# Patient Record
Sex: FEMALE | Race: WHITE | Employment: OTHER | URBAN - METROPOLITAN AREA
[De-identification: names, ages, dates, MRNs, and addresses within clinical notes are randomized per-mention and may not be internally consistent; named-entity substitution may affect disease eponyms.]

---

## 2018-10-30 ENCOUNTER — HOSPITAL ENCOUNTER (OUTPATIENT)
Dept: GENERAL RADIOLOGY | Age: 63
Discharge: HOME | End: 2018-11-01
Payer: COMMERCIAL

## 2018-10-30 DIAGNOSIS — Z12.31 VISIT FOR SCREENING MAMMOGRAM: ICD-10-CM

## 2018-10-30 PROCEDURE — 77063 BREAST TOMOSYNTHESIS BI: CPT

## 2019-01-12 ENCOUNTER — HOSPITAL ENCOUNTER (OUTPATIENT)
Dept: MRI IMAGING | Age: 64
Discharge: HOME | End: 2019-01-14
Payer: COMMERCIAL

## 2019-01-12 DIAGNOSIS — M43.10 ANTEROLISTHESIS: ICD-10-CM

## 2019-01-14 ENCOUNTER — OFFICE VISIT (OUTPATIENT)
Dept: NEUROSURGERY | Age: 64
End: 2019-01-14
Payer: COMMERCIAL

## 2019-01-14 VITALS
DIASTOLIC BLOOD PRESSURE: 96 MMHG | HEART RATE: 79 BPM | HEIGHT: 62 IN | BODY MASS INDEX: 33.86 KG/M2 | SYSTOLIC BLOOD PRESSURE: 122 MMHG | WEIGHT: 184 LBS

## 2019-01-14 DIAGNOSIS — M51.36 LUMBAR DEGENERATIVE DISC DISEASE: Primary | ICD-10-CM

## 2019-01-14 PROCEDURE — G8417 CALC BMI ABV UP PARAM F/U: HCPCS | Performed by: PHYSICIAN ASSISTANT

## 2019-01-14 PROCEDURE — 4004F PT TOBACCO SCREEN RCVD TLK: CPT | Performed by: PHYSICIAN ASSISTANT

## 2019-01-14 PROCEDURE — 3017F COLORECTAL CA SCREEN DOC REV: CPT | Performed by: PHYSICIAN ASSISTANT

## 2019-01-14 PROCEDURE — G8484 FLU IMMUNIZE NO ADMIN: HCPCS | Performed by: PHYSICIAN ASSISTANT

## 2019-01-14 PROCEDURE — G8427 DOCREV CUR MEDS BY ELIG CLIN: HCPCS | Performed by: PHYSICIAN ASSISTANT

## 2019-01-14 PROCEDURE — 99203 OFFICE O/P NEW LOW 30 MIN: CPT | Performed by: PHYSICIAN ASSISTANT

## 2019-01-14 ASSESSMENT — ENCOUNTER SYMPTOMS
GASTROINTESTINAL NEGATIVE: 1
BACK PAIN: 1
ALLERGIC/IMMUNOLOGIC NEGATIVE: 1
RESPIRATORY NEGATIVE: 1
EYES NEGATIVE: 1

## 2019-01-17 ENCOUNTER — HOSPITAL ENCOUNTER (OUTPATIENT)
Dept: GENERAL RADIOLOGY | Age: 64
Discharge: HOME | End: 2019-01-19
Payer: COMMERCIAL

## 2019-01-17 ENCOUNTER — HOSPITAL ENCOUNTER (OUTPATIENT)
Dept: MRI IMAGING | Age: 64
Discharge: HOME | End: 2019-01-19
Payer: COMMERCIAL

## 2019-01-17 ENCOUNTER — HOSPITAL ENCOUNTER (OUTPATIENT)
Age: 64
Discharge: HOME | End: 2019-01-19
Payer: COMMERCIAL

## 2019-01-17 DIAGNOSIS — M51.36 LUMBAR DEGENERATIVE DISC DISEASE: ICD-10-CM

## 2019-01-17 PROCEDURE — 72120 X-RAY BEND ONLY L-S SPINE: CPT

## 2019-01-17 PROCEDURE — 72100 X-RAY EXAM L-S SPINE 2/3 VWS: CPT

## 2019-01-17 PROCEDURE — 72148 MRI LUMBAR SPINE W/O DYE: CPT

## 2019-02-11 ENCOUNTER — OFFICE VISIT (OUTPATIENT)
Dept: NEUROSURGERY | Age: 64
End: 2019-02-11
Payer: COMMERCIAL

## 2019-02-11 VITALS
WEIGHT: 188 LBS | HEIGHT: 62 IN | SYSTOLIC BLOOD PRESSURE: 145 MMHG | HEART RATE: 83 BPM | DIASTOLIC BLOOD PRESSURE: 99 MMHG | BODY MASS INDEX: 34.6 KG/M2

## 2019-02-11 DIAGNOSIS — M51.26 LUMBAR DISC HERNIATION: Primary | ICD-10-CM

## 2019-02-11 PROCEDURE — 3017F COLORECTAL CA SCREEN DOC REV: CPT | Performed by: PHYSICIAN ASSISTANT

## 2019-02-11 PROCEDURE — 1036F TOBACCO NON-USER: CPT | Performed by: PHYSICIAN ASSISTANT

## 2019-02-11 PROCEDURE — G8417 CALC BMI ABV UP PARAM F/U: HCPCS | Performed by: PHYSICIAN ASSISTANT

## 2019-02-11 PROCEDURE — 99213 OFFICE O/P EST LOW 20 MIN: CPT | Performed by: PHYSICIAN ASSISTANT

## 2019-02-11 PROCEDURE — G8484 FLU IMMUNIZE NO ADMIN: HCPCS | Performed by: PHYSICIAN ASSISTANT

## 2019-02-11 PROCEDURE — G8427 DOCREV CUR MEDS BY ELIG CLIN: HCPCS | Performed by: PHYSICIAN ASSISTANT

## 2019-02-11 RX ORDER — CELECOXIB 200 MG/1
200 CAPSULE ORAL DAILY
Qty: 60 CAPSULE | Refills: 3 | Status: SHIPPED | OUTPATIENT
Start: 2019-02-11 | End: 2019-11-22

## 2019-11-22 ENCOUNTER — OFFICE VISIT (OUTPATIENT)
Dept: PAIN MANAGEMENT | Age: 64
End: 2019-11-22
Payer: COMMERCIAL

## 2019-11-22 VITALS
OXYGEN SATURATION: 96 % | HEART RATE: 86 BPM | WEIGHT: 205 LBS | RESPIRATION RATE: 16 BRPM | TEMPERATURE: 98.7 F | BODY MASS INDEX: 37.73 KG/M2 | SYSTOLIC BLOOD PRESSURE: 130 MMHG | HEIGHT: 62 IN | DIASTOLIC BLOOD PRESSURE: 88 MMHG

## 2019-11-22 DIAGNOSIS — M47.816 LUMBAR FACET ARTHROPATHY: ICD-10-CM

## 2019-11-22 DIAGNOSIS — F11.11 HISTORY OF OPIOID ABUSE (HCC): ICD-10-CM

## 2019-11-22 DIAGNOSIS — M47.816 LUMBAR SPONDYLOSIS: Primary | ICD-10-CM

## 2019-11-22 DIAGNOSIS — G89.4 CHRONIC PAIN SYNDROME: ICD-10-CM

## 2019-11-22 DIAGNOSIS — M51.9 LUMBAR DISC DISORDER: ICD-10-CM

## 2019-11-22 PROCEDURE — 99204 OFFICE O/P NEW MOD 45 MIN: CPT | Performed by: PAIN MEDICINE

## 2019-11-22 PROCEDURE — 1036F TOBACCO NON-USER: CPT | Performed by: PAIN MEDICINE

## 2019-11-22 PROCEDURE — G8417 CALC BMI ABV UP PARAM F/U: HCPCS | Performed by: PAIN MEDICINE

## 2019-11-22 PROCEDURE — G8484 FLU IMMUNIZE NO ADMIN: HCPCS | Performed by: PAIN MEDICINE

## 2019-11-22 PROCEDURE — G8427 DOCREV CUR MEDS BY ELIG CLIN: HCPCS | Performed by: PAIN MEDICINE

## 2019-11-22 PROCEDURE — 3017F COLORECTAL CA SCREEN DOC REV: CPT | Performed by: PAIN MEDICINE

## 2019-11-22 RX ORDER — VARENICLINE TARTRATE 1 MG/1
TABLET, FILM COATED ORAL
COMMUNITY
Start: 2019-08-30 | End: 2021-05-03 | Stop reason: ALTCHOICE

## 2019-11-22 RX ORDER — ESTROGEN,CON/M-PROGEST ACET 0.3-1.5MG
TABLET ORAL
COMMUNITY
Start: 2019-11-16 | End: 2021-05-03 | Stop reason: ALTCHOICE

## 2019-11-22 RX ORDER — BUSPIRONE HYDROCHLORIDE 10 MG/1
TABLET ORAL
COMMUNITY
Start: 2019-10-18 | End: 2021-05-03 | Stop reason: ALTCHOICE

## 2021-04-26 ENCOUNTER — HOSPITAL ENCOUNTER (OUTPATIENT)
Dept: INTERVENTIONAL RADIOLOGY/VASCULAR | Age: 66
Discharge: HOME | End: 2021-04-28
Payer: COMMERCIAL

## 2021-04-26 ENCOUNTER — HOSPITAL ENCOUNTER (OUTPATIENT)
Dept: ULTRASOUND IMAGING | Age: 66
Discharge: HOME | End: 2021-04-26
Payer: COMMERCIAL

## 2021-04-26 DIAGNOSIS — R60.9 EDEMA, UNSPECIFIED TYPE: ICD-10-CM

## 2021-04-26 PROCEDURE — 93922 UPR/L XTREMITY ART 2 LEVELS: CPT

## 2021-04-26 PROCEDURE — 93970 EXTREMITY STUDY: CPT

## 2021-05-03 ENCOUNTER — OFFICE VISIT (OUTPATIENT)
Dept: CARDIOLOGY CLINIC | Age: 66
End: 2021-05-03
Payer: COMMERCIAL

## 2021-05-03 VITALS
RESPIRATION RATE: 16 BRPM | OXYGEN SATURATION: 96 % | BODY MASS INDEX: 39.42 KG/M2 | HEIGHT: 62 IN | DIASTOLIC BLOOD PRESSURE: 82 MMHG | WEIGHT: 214.2 LBS | SYSTOLIC BLOOD PRESSURE: 122 MMHG | HEART RATE: 86 BPM

## 2021-05-03 DIAGNOSIS — R06.2 WHEEZING: ICD-10-CM

## 2021-05-03 DIAGNOSIS — R94.31 ABNORMAL EKG: Primary | ICD-10-CM

## 2021-05-03 PROCEDURE — 93000 ELECTROCARDIOGRAM COMPLETE: CPT | Performed by: INTERNAL MEDICINE

## 2021-05-03 PROCEDURE — G8417 CALC BMI ABV UP PARAM F/U: HCPCS | Performed by: INTERNAL MEDICINE

## 2021-05-03 PROCEDURE — 1090F PRES/ABSN URINE INCON ASSESS: CPT | Performed by: INTERNAL MEDICINE

## 2021-05-03 PROCEDURE — G8400 PT W/DXA NO RESULTS DOC: HCPCS | Performed by: INTERNAL MEDICINE

## 2021-05-03 PROCEDURE — 1036F TOBACCO NON-USER: CPT | Performed by: INTERNAL MEDICINE

## 2021-05-03 PROCEDURE — 4040F PNEUMOC VAC/ADMIN/RCVD: CPT | Performed by: INTERNAL MEDICINE

## 2021-05-03 PROCEDURE — 3017F COLORECTAL CA SCREEN DOC REV: CPT | Performed by: INTERNAL MEDICINE

## 2021-05-03 PROCEDURE — G8427 DOCREV CUR MEDS BY ELIG CLIN: HCPCS | Performed by: INTERNAL MEDICINE

## 2021-05-03 PROCEDURE — 1123F ACP DISCUSS/DSCN MKR DOCD: CPT | Performed by: INTERNAL MEDICINE

## 2021-05-03 PROCEDURE — 99202 OFFICE O/P NEW SF 15 MIN: CPT | Performed by: INTERNAL MEDICINE

## 2021-05-03 RX ORDER — ARIPIPRAZOLE 5 MG/1
5 TABLET ORAL DAILY
COMMUNITY

## 2021-05-03 RX ORDER — POTASSIUM CHLORIDE 20 MEQ/1
20 TABLET, EXTENDED RELEASE ORAL DAILY
Qty: 30 TABLET | Refills: 5 | Status: SHIPPED | OUTPATIENT
Start: 2021-05-03

## 2021-05-03 RX ORDER — FUROSEMIDE 40 MG/1
40 TABLET ORAL DAILY
Qty: 90 TABLET | Refills: 1 | Status: SHIPPED | OUTPATIENT
Start: 2021-05-03

## 2021-05-03 RX ORDER — ACETAMINOPHEN 160 MG
TABLET,DISINTEGRATING ORAL DAILY
COMMUNITY

## 2021-05-03 RX ORDER — QUETIAPINE FUMARATE 200 MG/1
200 TABLET, FILM COATED ORAL DAILY
COMMUNITY

## 2021-05-03 SDOH — HEALTH STABILITY: MENTAL HEALTH: HOW OFTEN DO YOU HAVE A DRINK CONTAINING ALCOHOL?: NOT ASKED

## 2021-05-03 SDOH — HEALTH STABILITY: MENTAL HEALTH: HOW MANY STANDARD DRINKS CONTAINING ALCOHOL DO YOU HAVE ON A TYPICAL DAY?: NOT ASKED

## 2021-05-03 ASSESSMENT — ENCOUNTER SYMPTOMS
VOMITING: 0
NAUSEA: 0
CONSTIPATION: 0
ABDOMINAL PAIN: 0
BACK PAIN: 1
WHEEZING: 0
SHORTNESS OF BREATH: 0
BLOOD IN STOOL: 0
COUGH: 1
DIARRHEA: 0

## 2021-05-04 ENCOUNTER — TELEPHONE (OUTPATIENT)
Dept: CARDIOLOGY | Age: 66
End: 2021-05-04

## 2021-06-18 ENCOUNTER — TELEPHONE (OUTPATIENT)
Dept: CARDIOLOGY | Age: 66
End: 2021-06-18

## 2021-09-16 ENCOUNTER — OFFICE VISIT (OUTPATIENT)
Dept: PULMONOLOGY | Age: 66
End: 2021-09-16
Payer: MEDICARE

## 2021-09-16 VITALS
OXYGEN SATURATION: 96 % | HEIGHT: 62 IN | HEART RATE: 85 BPM | RESPIRATION RATE: 18 BRPM | WEIGHT: 214 LBS | BODY MASS INDEX: 39.38 KG/M2 | SYSTOLIC BLOOD PRESSURE: 171 MMHG | DIASTOLIC BLOOD PRESSURE: 82 MMHG

## 2021-09-16 DIAGNOSIS — J44.9 CHRONIC OBSTRUCTIVE PULMONARY DISEASE, UNSPECIFIED COPD TYPE (HCC): Primary | ICD-10-CM

## 2021-09-16 DIAGNOSIS — R06.2 WHEEZE: ICD-10-CM

## 2021-09-16 DIAGNOSIS — Z72.0 TOBACCO USE: ICD-10-CM

## 2021-09-16 DIAGNOSIS — Z71.85 VACCINE COUNSELING: ICD-10-CM

## 2021-09-16 LAB
DLCO %PRED: 66 %
DLCO PRED: 21.56 ML/MIN/MMHG
DLCO/VA %PRED: NORMAL
DLCO/VA PRED: NORMAL
DLCO/VA: NORMAL
DLCO: 14.32 ML/MIN/MMHG
EXPIRATORY TIME-POST: NORMAL
EXPIRATORY TIME: NORMAL
FEF 25-75% %CHNG: NORMAL
FEF 25-75% %PRED-POST: NORMAL
FEF 25-75% %PRED-PRE: NORMAL
FEF 25-75% PRED: NORMAL
FEF 25-75%-POST: NORMAL
FEF 25-75%-PRE: NORMAL
FEV1 %PRED-POST: 79 %
FEV1 %PRED-PRE: 72 %
FEV1 PRED: 2.19 L
FEV1-POST: 1.73 L
FEV1-PRE: 1.59 L
FEV1/FVC %PRED-POST: 91 %
FEV1/FVC %PRED-PRE: 92 %
FEV1/FVC PRED: 73 %
FEV1/FVC-POST: 72 %
FEV1/FVC-PRE: 79 %
FVC %PRED-POST: 86 L
FVC %PRED-PRE: 77 %
FVC PRED: 2.79 L
FVC-POST: 2.41 L
FVC-PRE: 2.17 L
GAW %PRED: NORMAL
GAW PRED: NORMAL
GAW: NORMAL
IC %PRED: NORMAL
IC PRED: NORMAL
IC: NORMAL
MEP: NORMAL
MIP: NORMAL
MVV %PRED-PRE: 61 %
MVV PRED: 84 L/MIN
MVV-PRE: 51 L/MIN
PEF %PRED-POST: NORMAL
PEF %PRED-PRE: NORMAL
PEF PRED: NORMAL
PEF%CHNG: NORMAL
PEF-POST: NORMAL
PEF-PRE: NORMAL
RAW %PRED: NORMAL
RAW PRED: NORMAL
RAW: NORMAL
RV %PRED: NORMAL
RV PRED: NORMAL
RV: NORMAL
SVC %PRED: NORMAL
SVC PRED: NORMAL
SVC: NORMAL
TLC %PRED: 269 %
TLC PRED: 2.69 L
TLC: 9.35 L
VA %PRED: NORMAL
VA PRED: NORMAL
VA: NORMAL
VTG %PRED: NORMAL
VTG PRED: NORMAL
VTG: NORMAL

## 2021-09-16 PROCEDURE — 4040F PNEUMOC VAC/ADMIN/RCVD: CPT | Performed by: INTERNAL MEDICINE

## 2021-09-16 PROCEDURE — 99203 OFFICE O/P NEW LOW 30 MIN: CPT | Performed by: INTERNAL MEDICINE

## 2021-09-16 PROCEDURE — G8925 SPIR FEV1/FVC>=60% & NO COPD: HCPCS | Performed by: INTERNAL MEDICINE

## 2021-09-16 PROCEDURE — G8417 CALC BMI ABV UP PARAM F/U: HCPCS | Performed by: INTERNAL MEDICINE

## 2021-09-16 PROCEDURE — 94060 EVALUATION OF WHEEZING: CPT | Performed by: INTERNAL MEDICINE

## 2021-09-16 PROCEDURE — 3017F COLORECTAL CA SCREEN DOC REV: CPT | Performed by: INTERNAL MEDICINE

## 2021-09-16 PROCEDURE — 3023F SPIROM DOC REV: CPT | Performed by: INTERNAL MEDICINE

## 2021-09-16 PROCEDURE — 1123F ACP DISCUSS/DSCN MKR DOCD: CPT | Performed by: INTERNAL MEDICINE

## 2021-09-16 PROCEDURE — 94727 GAS DIL/WSHOT DETER LNG VOL: CPT | Performed by: INTERNAL MEDICINE

## 2021-09-16 PROCEDURE — G8400 PT W/DXA NO RESULTS DOC: HCPCS | Performed by: INTERNAL MEDICINE

## 2021-09-16 PROCEDURE — 1036F TOBACCO NON-USER: CPT | Performed by: INTERNAL MEDICINE

## 2021-09-16 PROCEDURE — 1090F PRES/ABSN URINE INCON ASSESS: CPT | Performed by: INTERNAL MEDICINE

## 2021-09-16 PROCEDURE — G8427 DOCREV CUR MEDS BY ELIG CLIN: HCPCS | Performed by: INTERNAL MEDICINE

## 2021-09-16 RX ORDER — VARENICLINE TARTRATE
1 KIT SEE ADMIN INSTRUCTIONS
Qty: 42 TABLET | Refills: 3 | Status: SHIPPED
Start: 2021-09-16 | End: 2021-11-27

## 2021-09-16 RX ORDER — ALBUTEROL SULFATE 90 UG/1
2 AEROSOL, METERED RESPIRATORY (INHALATION) EVERY 6 HOURS PRN
Qty: 1 EACH | Refills: 3 | Status: SHIPPED | OUTPATIENT
Start: 2021-09-16

## 2021-09-16 ASSESSMENT — PULMONARY FUNCTION TESTS
FEV1/FVC_POST: 72
FEV1_PERCENT_PREDICTED_PRE: 72
FEV1/FVC_PREDICTED: 73
FEV1/FVC_PERCENT_PREDICTED_POST: 91
FVC_PERCENT_PREDICTED_POST: 86
FVC_PRE: 2.17
FEV1_POST: 1.73
FEV1/FVC_PERCENT_PREDICTED_PRE: 92
FEV1_PERCENT_PREDICTED_POST: 79
FEV1_PREDICTED: 2.19
FEV1/FVC_PRE: 79
FVC_PERCENT_PREDICTED_PRE: 77
FVC_POST: 2.41
FEV1_PRE: 1.59
FVC_PREDICTED: 2.79

## 2021-09-16 NOTE — PATIENT INSTRUCTIONS
[address] [facility]  [address] [facility], [address] [place], [place] [address]sarah S  Office: [phone]      Your were seen in the office today for COPD/wheezing      We  did make changes to your medications today. Chantix ordered. Albuterol as needed. Incruse inhaler daily. Testing ordered today was none      Vaccines recommended Prevnar 13, influenza, covid-19. Please do not hesitate to call the office with any questions.

## 2021-10-24 NOTE — PROGRESS NOTES
[facility]     HISTORY OF PRESENT ILLNESS:    Venora Aase is a 73y.o. year old female here for evaluation of wheezing. The patient reports current symptoms of coughing, wheezing, and dyspnea with exertion. She also reports increased sinus drainage and congestion. She reports that the change of seasons makes her symptoms worse. She is currently smoking 1/2 pack/day. She denies hemoptysis. Denies any unintentional weight loss. ALLERGIES:    Allergies   Allergen Reactions    Acetaminophen     Asa [Aspirin]     Ibuprofen        PAST MEDICAL HISTORY:       Diagnosis Date    Anxiety     Bruising tendency (HCC)     Chronic back pain     DDD (degenerative disc disease), lumbar 05/27/2014    Depression     Headache     Knee injury right knee     Leg pain     Radiculopathy, lumbar region 05/09/2014    Tobacco abuse        MEDICATIONS:   Current Outpatient Medications   Medication Sig Dispense Refill    Umeclidinium Bromide 62.5 MCG/INH AEPB Inhale 1 puff into the lungs daily 1 each 3    varenicline (CHANTIX STARTING MONTH PAK) 0.5 MG X 11 & 1 MG X 42 tablet Take 1 mg by mouth See Admin Instructions Take by mouth. 42 tablet 3    albuterol sulfate HFA (VENTOLIN HFA) 108 (90 Base) MCG/ACT inhaler Inhale 2 puffs into the lungs every 6 hours as needed for Wheezing 1 each 3    ARIPiprazole (ABILIFY) 5 MG tablet Take 5 mg by mouth daily      QUEtiapine (SEROQUEL) 200 MG tablet Take 200 mg by mouth daily      Cholecalciferol (VITAMIN D3) 50 MCG (2000 UT) CAPS Take by mouth daily      furosemide (LASIX) 40 MG tablet Take 1 tablet by mouth daily 90 tablet 1    potassium chloride (KLOR-CON M) 20 MEQ extended release tablet Take 1 tablet by mouth daily 30 tablet 5    pantoprazole (PROTONIX) 40 MG tablet Take 40 mg by mouth daily      mirtazapine (REMERON) 30 MG tablet Take 30 mg by mouth nightly.         desvenlafaxine (PRISTIQ) 50 MG TB24 72% of predicted. FEV1 FVC ratio is 73. There is no significant bronchodilator response. MVV is 51 which is 61% of predicted. Lung volumes show SVC of 2.14 which is 76% of predicted. RV is 7.21 L which is 360% of predicted. Total lung capacity is 9.37 which is 196% of predicted. Diffusion shows diffusion of 14.32 which is 66% of predicted. Corrected for alveolar ventilation this is 75% of predicted. There is some scooping of the expiratory flow volume loop on the flow volume loop which is consistent with obstruction. Overall pulmonary function testing is consistent with a moderate obstruction with no significant bronchodilator response along with significant air trapping and a moderate reduction in DLCO. IMPRESSION:       1. COPD  2. Tobacco use  3. Vaccine counseling              PLAN:      1. Regarding the patient's COPD she is to continue as needed albuterol and also Incruse. She is to call the office if her symptoms worsen or fail to improve. 2.  Regarding tobacco use we did speak extensively about the continued drop in lung function and risk of strokes, coronary artery disease, cancer, and worsening pulmonary function. She was prescribed Chantix at this time and reports that she has previously quit smoking with this. 3.  The patient was extensively counseled regarding vaccine use. We did recommend the COVID-19 vaccine, pneumococcal vaccines, and influenza vaccine. The patient declines at this time. I hope this updates you on my evaluation and clinical thinking. Thank you for allowing me to participate in his care.      Sincerely,        Sivakumar Araiza.  Office: [phone]  Fax: [phone]

## 2021-11-27 ENCOUNTER — APPOINTMENT (OUTPATIENT)
Dept: CT IMAGING | Age: 66
End: 2021-11-27
Payer: MEDICARE

## 2021-11-27 ENCOUNTER — HOSPITAL ENCOUNTER (EMERGENCY)
Age: 66
Discharge: HOME | End: 2021-11-27
Payer: MEDICARE

## 2021-11-27 VITALS
SYSTOLIC BLOOD PRESSURE: 149 MMHG | OXYGEN SATURATION: 98 % | RESPIRATION RATE: 16 BRPM | BODY MASS INDEX: 41.22 KG/M2 | HEART RATE: 90 BPM | TEMPERATURE: 98.4 F | DIASTOLIC BLOOD PRESSURE: 86 MMHG | HEIGHT: 62 IN | WEIGHT: 224 LBS

## 2021-11-27 DIAGNOSIS — R59.1 LYMPHADENOPATHY OF HEAD AND NECK: Primary | ICD-10-CM

## 2021-11-27 LAB
ALBUMIN SERPL-MCNC: 4.2 G/DL (ref 3.5–5.2)
ALP BLD-CCNC: 84 U/L (ref 35–104)
ALT SERPL-CCNC: 26 U/L (ref 0–32)
ANION GAP SERPL CALCULATED.3IONS-SCNC: 14 MMOL/L (ref 7–16)
AST SERPL-CCNC: 19 U/L (ref 0–31)
BASOPHILS ABSOLUTE: 0.06 E9/L (ref 0–0.2)
BASOPHILS RELATIVE PERCENT: 0.5 % (ref 0–2)
BILIRUB SERPL-MCNC: 0.5 MG/DL (ref 0–1.2)
BUN BLDV-MCNC: 18 MG/DL (ref 6–23)
CALCIUM SERPL-MCNC: 9.3 MG/DL (ref 8.6–10.2)
CHLORIDE BLD-SCNC: 97 MMOL/L (ref 98–107)
CO2: 24 MMOL/L (ref 22–29)
CREAT SERPL-MCNC: 0.8 MG/DL (ref 0.5–1)
EOSINOPHILS ABSOLUTE: 0.07 E9/L (ref 0.05–0.5)
EOSINOPHILS RELATIVE PERCENT: 0.6 % (ref 0–6)
GFR AFRICAN AMERICAN: >60
GFR NON-AFRICAN AMERICAN: >60 ML/MIN/1.73
GLUCOSE BLD-MCNC: 131 MG/DL (ref 74–99)
HCT VFR BLD CALC: 36.5 % (ref 34–48)
HEMOGLOBIN: 12.1 G/DL (ref 11.5–15.5)
IMMATURE GRANULOCYTES #: 0.07 E9/L
IMMATURE GRANULOCYTES %: 0.6 % (ref 0–5)
LACTIC ACID: 1.7 MMOL/L (ref 0.5–2.2)
LYMPHOCYTES ABSOLUTE: 1 E9/L (ref 1.5–4)
LYMPHOCYTES RELATIVE PERCENT: 8.3 % (ref 20–42)
MCH RBC QN AUTO: 29.2 PG (ref 26–35)
MCHC RBC AUTO-ENTMCNC: 33.2 % (ref 32–34.5)
MCV RBC AUTO: 88 FL (ref 80–99.9)
MONOCYTES ABSOLUTE: 0.88 E9/L (ref 0.1–0.95)
MONOCYTES RELATIVE PERCENT: 7.3 % (ref 2–12)
NEUTROPHILS ABSOLUTE: 9.98 E9/L (ref 1.8–7.3)
NEUTROPHILS RELATIVE PERCENT: 82.7 % (ref 43–80)
PDW BLD-RTO: 12.6 FL (ref 11.5–15)
PLATELET # BLD: 317 E9/L (ref 130–450)
PMV BLD AUTO: 9.5 FL (ref 7–12)
POTASSIUM REFLEX MAGNESIUM: 3.8 MMOL/L (ref 3.5–5)
RBC # BLD: 4.15 E12/L (ref 3.5–5.5)
SODIUM BLD-SCNC: 135 MMOL/L (ref 132–146)
TOTAL PROTEIN: 7.7 G/DL (ref 6.4–8.3)
WBC # BLD: 12.1 E9/L (ref 4.5–11.5)

## 2021-11-27 PROCEDURE — 96374 THER/PROPH/DIAG INJ IV PUSH: CPT

## 2021-11-27 PROCEDURE — 96375 TX/PRO/DX INJ NEW DRUG ADDON: CPT

## 2021-11-27 PROCEDURE — 85025 COMPLETE CBC W/AUTO DIFF WBC: CPT

## 2021-11-27 PROCEDURE — 2580000003 HC RX 258: Performed by: PHYSICIAN ASSISTANT

## 2021-11-27 PROCEDURE — 80053 COMPREHEN METABOLIC PANEL: CPT

## 2021-11-27 PROCEDURE — 6360000004 HC RX CONTRAST MEDICATION: Performed by: RADIOLOGY

## 2021-11-27 PROCEDURE — 6360000002 HC RX W HCPCS: Performed by: PHYSICIAN ASSISTANT

## 2021-11-27 PROCEDURE — 99284 EMERGENCY DEPT VISIT MOD MDM: CPT

## 2021-11-27 PROCEDURE — 70491 CT SOFT TISSUE NECK W/DYE: CPT

## 2021-11-27 PROCEDURE — 83605 ASSAY OF LACTIC ACID: CPT

## 2021-11-27 PROCEDURE — 2580000003 HC RX 258: Performed by: RADIOLOGY

## 2021-11-27 RX ORDER — AMOXICILLIN AND CLAVULANATE POTASSIUM 875; 125 MG/1; MG/1
1 TABLET, FILM COATED ORAL 2 TIMES DAILY
Qty: 20 TABLET | Refills: 0 | Status: SHIPPED | OUTPATIENT
Start: 2021-11-27 | End: 2021-12-07

## 2021-11-27 RX ORDER — SODIUM CHLORIDE 0.9 % (FLUSH) 0.9 %
10 SYRINGE (ML) INJECTION PRN
Status: COMPLETED | OUTPATIENT
Start: 2021-11-27 | End: 2021-11-27

## 2021-11-27 RX ORDER — KETOROLAC TROMETHAMINE 30 MG/ML
15 INJECTION, SOLUTION INTRAMUSCULAR; INTRAVENOUS ONCE
Status: COMPLETED | OUTPATIENT
Start: 2021-11-27 | End: 2021-11-27

## 2021-11-27 RX ORDER — METHYLPREDNISOLONE 4 MG/1
TABLET ORAL
Qty: 1 KIT | Refills: 0 | Status: SHIPPED | OUTPATIENT
Start: 2021-11-27 | End: 2021-12-03

## 2021-11-27 RX ADMIN — KETOROLAC TROMETHAMINE 15 MG: 30 INJECTION, SOLUTION INTRAMUSCULAR; INTRAVENOUS at 14:02

## 2021-11-27 RX ADMIN — Medication 10 ML: at 15:26

## 2021-11-27 RX ADMIN — IOPAMIDOL 90 ML: 755 INJECTION, SOLUTION INTRAVENOUS at 15:26

## 2021-11-27 RX ADMIN — WATER 1000 MG: 1 INJECTION INTRAMUSCULAR; INTRAVENOUS; SUBCUTANEOUS at 14:01

## 2021-11-27 ASSESSMENT — PAIN DESCRIPTION - DESCRIPTORS
DESCRIPTORS: ACHING;THROBBING
DESCRIPTORS: ACHING
DESCRIPTORS: THROBBING

## 2021-11-27 ASSESSMENT — PAIN DESCRIPTION - PAIN TYPE
TYPE: ACUTE PAIN

## 2021-11-27 ASSESSMENT — PAIN DESCRIPTION - LOCATION
LOCATION: NECK
LOCATION: TEETH
LOCATION: TEETH

## 2021-11-27 ASSESSMENT — PAIN SCALES - GENERAL
PAINLEVEL_OUTOF10: 10
PAINLEVEL_OUTOF10: 5
PAINLEVEL_OUTOF10: 10
PAINLEVEL_OUTOF10: 3

## 2021-11-27 ASSESSMENT — PAIN DESCRIPTION - ORIENTATION
ORIENTATION: LEFT;LOWER
ORIENTATION: LEFT;LOWER

## 2021-11-27 ASSESSMENT — PAIN DESCRIPTION - ONSET: ONSET: AWAKENED FROM SLEEP

## 2021-11-27 ASSESSMENT — PAIN DESCRIPTION - FREQUENCY: FREQUENCY: CONTINUOUS

## 2021-11-27 NOTE — ED PROVIDER NOTES
Independent MLP    HPI:  11/27/21, Time: 1:53 PM TIM Arteaga is a 73 y.o. female presenting to the ED for left sided lower facial/upper neck swelling, beginning 1 day ago. The complaint has been persistent, moderate in severity, and worsened by movement of jaw. Patient states that she was at urgent care just prior to arrival who recommended ED evaluation secondary to potential abscess. Reports that she is still able to swallow does not hard foods. She is able to tolerate soup and drinks without any difficulty. No difficulty breathing. Denies any dental pain. Denies any intraoral swelling. Denies any injury to the area. She has been afebrile without recent travel or sick contacts. Patient denies other symptoms at this time. Review of Systems:   A complete review of systems was performed and pertinent positives and negatives are stated within HPI, all other systems reviewed and are negative.          --------------------------------------------- PAST HISTORY ---------------------------------------------  Past Medical History:  has a past medical history of Anxiety, Bruising tendency (HCC), Chronic back pain, DDD (degenerative disc disease), lumbar, Depression, Headache, Knee injury, Leg pain, Radiculopathy, lumbar region, and Tobacco abuse. Past Surgical History:  has a past surgical history that includes Cholecystectomy; Tonsillectomy (at age 12); and joint replacement (Bilateral). Social History:  reports that she quit smoking about 9 months ago. Her smoking use included cigarettes. She has a 42.00 pack-year smoking history. She has never used smokeless tobacco. She reports previous alcohol use. She reports that she does not use drugs. Family History: family history includes Asthma in her mother; Cancer in her father; Heart Attack in her maternal grandfather; Kidney Disease in her sister; No Known Problems in her brother and brother.      The patients home medications have been reviewed. Allergies: Patient has no known allergies.     -------------------------------------------------- RESULTS -------------------------------------------------  All laboratory and radiology results have been personally reviewed by myself   LABS:  Results for orders placed or performed during the hospital encounter of 11/27/21   CBC Auto Differential   Result Value Ref Range    WBC 12.1 (H) 4.5 - 11.5 E9/L    RBC 4.15 3.50 - 5.50 E12/L    Hemoglobin 12.1 11.5 - 15.5 g/dL    Hematocrit 36.5 34.0 - 48.0 %    MCV 88.0 80.0 - 99.9 fL    MCH 29.2 26.0 - 35.0 pg    MCHC 33.2 32.0 - 34.5 %    RDW 12.6 11.5 - 15.0 fL    Platelets 578 053 - 067 E9/L    MPV 9.5 7.0 - 12.0 fL    Neutrophils % 82.7 (H) 43.0 - 80.0 %    Immature Granulocytes % 0.6 0.0 - 5.0 %    Lymphocytes % 8.3 (L) 20.0 - 42.0 %    Monocytes % 7.3 2.0 - 12.0 %    Eosinophils % 0.6 0.0 - 6.0 %    Basophils % 0.5 0.0 - 2.0 %    Neutrophils Absolute 9.98 (H) 1.80 - 7.30 E9/L    Immature Granulocytes # 0.07 E9/L    Lymphocytes Absolute 1.00 (L) 1.50 - 4.00 E9/L    Monocytes Absolute 0.88 0.10 - 0.95 E9/L    Eosinophils Absolute 0.07 0.05 - 0.50 E9/L    Basophils Absolute 0.06 0.00 - 0.20 E9/L   Comprehensive Metabolic Panel w/ Reflex to MG   Result Value Ref Range    Sodium 135 132 - 146 mmol/L    Potassium reflex Magnesium 3.8 3.5 - 5.0 mmol/L    Chloride 97 (L) 98 - 107 mmol/L    CO2 24 22 - 29 mmol/L    Anion Gap 14 7 - 16 mmol/L    Glucose 131 (H) 74 - 99 mg/dL    BUN 18 6 - 23 mg/dL    CREATININE 0.8 0.5 - 1.0 mg/dL    GFR Non-African American >60 >=60 mL/min/1.73    GFR African American >60     Calcium 9.3 8.6 - 10.2 mg/dL    Total Protein 7.7 6.4 - 8.3 g/dL    Albumin 4.2 3.5 - 5.2 g/dL    Total Bilirubin 0.5 0.0 - 1.2 mg/dL    Alkaline Phosphatase 84 35 - 104 U/L    ALT 26 0 - 32 U/L    AST 19 0 - 31 U/L   Lactic Acid, Plasma   Result Value Ref Range    Lactic Acid 1.7 0.5 - 2.2 mmol/L       RADIOLOGY:  Interpreted by Radiologist.  CT SOFT TISSUE IntraVENous Stopped 11/27/21 1413)   iopamidol (ISOVUE-370) 76 % injection 90 mL (90 mLs IntraVENous Given 11/27/21 1526)   sodium chloride flush 0.9 % injection 10 mL (10 mLs IntraVENous Given 11/27/21 1526)         ED COURSE:       Medical Decision Making:    Patient is a 71-year-old female presenting emergency department with left lower jaw swelling. Patient does have a mild leukocytosis otherwise lab work is stable. There is soft tissue edema and lymphadenopathy noted on CT soft tissue neck without any discernible drainable abscess. Airway is patent. Patient is tolerating oral intake in the emergency without difficulty. No airway compromise or respiratory distress. Patient reports that she does feel better after first dose of anti-inflammatories and antibiotics. Recommended close follow-up with PCP for recheck. ENT follow-up also provided. Discussed return precautions with the patient. Did advise that this could potentially get worse before gets better and she needs return to the ED if that is the case. Patient voiced understanding is agreeable to the above treatment plan. Counseling: The emergency provider has spoken with the patient and discussed todays results, in addition to providing specific details for the plan of care and counseling regarding the diagnosis and prognosis. Questions are answered at this time and they are agreeable with the plan.      --------------------------------- IMPRESSION AND DISPOSITION ---------------------------------    IMPRESSION  1. Lymphadenopathy of head and neck        DISPOSITION  Disposition: Discharge to home  Patient condition is stable      NOTE: This report was transcribed using voice recognition software.  Every effort was made to ensure accuracy; however, inadvertent computerized transcription errors may be present        Kamaljitchristian Rodriguez [place]  11/27/21 8684

## 2021-12-15 ENCOUNTER — HOSPITAL ENCOUNTER (OUTPATIENT)
Dept: DIABETES SERVICES | Age: 66
Setting detail: THERAPIES SERIES
Discharge: HOME | End: 2021-12-15
Payer: MEDICARE

## 2021-12-15 PROCEDURE — G0108 DIAB MANAGE TRN  PER INDIV: HCPCS

## 2021-12-15 SDOH — ECONOMIC STABILITY: FOOD INSECURITY: ADDITIONAL INFORMATION: NO

## 2021-12-15 ASSESSMENT — PROBLEM AREAS IN DIABETES QUESTIONNAIRE (PAID)
FEELING THAT DIABETES IS TAKING UP TOO MUCH OF YOUR MENTAL AND PHYSICAL ENERGY EVERY DAY: 0
FEELING DEPRESSED WHEN YOU THINK ABOUT LIVING WITH DIABETES: 1
FEELING SCARED WHEN YOU THINK ABOUT LIVING WITH DIABETES: 1
COPING WITH COMPLICATIONS OF DIABETES: 0
WORRYING ABOUT THE FUTURE AND THE POSSIBILITY OF SERIOUS COMPLICATIONS: 1
PAID-5 TOTAL SCORE: 3

## 2021-12-15 NOTE — LETTER
[facility] Diabetes Education DSMES Follow-up Letter    Name: Parisa Calles  :   1955    Follow-up plan/Date:   3/3/2022               Racquel Romero     Dear Dr. Rowan Mccoy: Thank you for referring  Parisa Calles  to [facility] Diabetes Education Services. Parisa Calles  has completed their personalized comprehensive education plan. The education plan included the following topics: Diabetes Disease Process, Nutrition, Exercise, Glucose Monitoring, Acute and Chronic Complication, Behavioral and Lifestyle Change, Healthy Coping and goal setting. We contact participants 3 months after attending our services to review their progress on their chosen goal.      The following area was chosen: [x] Healthy Eating       Selected goal outcome Post Education:     Patient states they met their goal 75% of time. Thank you for referring this patient to our program. Please do not hesitate to call if we can be of any service for this patient.      Esthela Mcqueen 6 or Daren: Gabi: Alexander Davila RN [id number] [facility] Diabetes Education Department  American Diabetes Association Recognized DSMES Program

## 2021-12-15 NOTE — LETTER
[facility]- Diabetes Education Department Initial Diabetes Education Letter    2021       Re:     Venora Aase         :  1955  Dear Dr. Carey Orozco: Thank you for referring your patient, Venora Aase, for diabetes education. Venora Aase has completed their comprehensive education plan today which included the topics below:    Nursing/Medical [x]      Nutrition [x]  [] Diabetes disease process & treatment   [] Diabetes medication use and safety   [] Self-monitoring blood glucose/interpreting results  [] Prevention, detection and treatment of acute complications  [] Prevention, detection and treatment of chronic complications  [] Developing strategies to address psychosocial issues    [] Nutritional management: basic principles   [] Carbohydrate counting, plate method, label reading  [] Benefits of/ways to incorporate physical activity  [] Problem solving and preparing for crisis situations  [] Diabetes supply management  [] Goal setting and behavior modification       PAID -5 (Problem Areas in Diabetes) Survey Results  3  A total score of 8 or greater indicates possible diabetes related emotional distress which warrants further assessment.  [] [address]  and Crisis Resource information provided. Comments: Patient will also be attending a MNT appointment on 22. PATIENT SELECTED GOAL:   [x]  I will eat a consistent amount of carbohydrates at meals. []  I will increase my activity to:  []  I will check my blood glucose as ordered by my doctor. []  I will take my medications at the correct times as ordered by my doctor.   []  Other:      DIABETES SELF-MANAGEMENT SUPPORT PLAN/REFERRALS (patient identified):  [x] Keep my scheduled visits with my doctor   [] Make and keep appointments with specialists (foot, eye, dentist) as recommended  [] Consult my pharmacist with all new medications and/or any medication questions  [] Get tested for sleep apnea  [] Seek help for:   [] Make an appointment with:   [] Attend smoking cessation classes or call help-line (5-620-QUIT-NOW; [phone])  [] Attend a diabetes support group  [] Use diabetes magazines, books, or the American Diabetes Association website (www.diabetes. org) for more information    [x] Start or increase exercising at home or join a program:  [] Other: There will be a follow-up in 3 months to evaluate the attainment of their chosen goal, and self identified support plan and you will be notified of their progress. Thank you for referring this patient to our program.  Please do not hesitate to call if you have any questions at [phone] [facility] or [facility]) or [phone] ([address] [place] [address]).         Sincerely,    Avinash Monique, RN, BSN, [address] [facility] Diabetes Education Department  American Diabetes Association Recognized DSMES Program

## 2021-12-16 NOTE — PROGRESS NOTES
Diabetes Self-Management Education Record    Participant Name: Kg Cadet  Referring Provider: ESTELLE Alston NP  Assessment/Evaluation Ratings:  1=Needs Instruction   4=Demonstrates Understanding/Competency  2=Needs Review   NC=Not Covered    3=Comprehends Key Points  N/A=Not Applicable  Topics/Learning Objectives Pre-session Assess Date:  Instructor initials/date  12/15/21 KMS Instr. Date    Instructor initials/date  12/15/21 KMS Follow-up Post- session Eval Comments   Diabetes disease process & Treatment process:   -Define type of diabetes in simple terms.  - Describe the ABCs of  diabetes management  -Identify own type of diabetes  -Identify lifestyle changes/treatment options  -other:  2 [x] All     []  []  []  []  []  4 New Type 2 DM   Developing strategies for Healthy coping/psychosocial issues:    -Describe feelings about living with diabetes  -Identify coping strategies and sources of stress  -Identify support needed & support network available  -Complete PAID-5 Diabetes questionnaire 1 [x] All     []  []  []    []  4   12/15/21 KMS  PAID-5 Score: 3     Prevention, detection & treatment of Chronic complications:    -Identify the prevention, detection and treatment for complications including immunizations, preventive eye, foot, dental and renal exams as indicated per the participant's duration of diabetes and health status.  -Define the natural course of diabetes and the relationship of blood glucose levels to long term complications of diabetes.   1 [x] All     []            []  4    Prevention, detection & treatment of acute complications:    -State the causes,signs & symptoms of hyper & hypoglycemia, and prevention & treatment strategies.   -Describe sick day guidelines  DKA /indications for ketone testing &  when to call physician  1 [x] All     []      []    4      -Identify severe weather/situation crisis  & diabetes supplies management  []      Using medications safely:   -[id number] effects of diabetes medicines on blood glucose levels;  -List diabetes medication taken, action & side effects 1 [x] All     []  []  4 12/15/21 KMS  Metformin 500 mg BID    Insulin/Injectables/glucagon  -Name appropriate injection sites; proper storage; supplies needed;     []   N/A    Demonstrate proper technique  []      Monitoring blood glucose, interpreting and using results:   -Identify the purpose of testing   -Identify recommended & personal blood glucose targets & HgbA1C target levels  -State the Importance of logging blood glucose levels for pattern recognition;   -State benefits of reading/using pt generated health data  -Verbalize safe lancet disposal 1 [x] All     []  []    []  []  []  3/4 12/15/21 KMS  Patient has meter at home, but forgot to bring for instruction. States she believes it is a FreeStyle. Instructed patient on a demo meter to give her a general idea of how to use. She states she will bring her glucose meter to the doctor's office for her appointment at 12/16 and the staff will help her there.    -Demonstrate proper testing technique  []      Incorporating physical activity into lifestyle:   -State effect of exercise on blood glucose levels;   -State benefits of regular exercise;   -Define safety considerations/food choices if needed.  -Describe contraindications/maintenance of activity. 1 [x] All     []  []    []  []  4 12/15/21 KMS  Patient is not physically active, but used to exercise at a local gym. Plans to start exercising again. Incorporating nutritional management into lifestyle:   -Describe effect of type, amount & timing of food on blood glucose  -Describe methods for preparing and planning healthy meals  -Correctly read food labels  -Name 3 foods high in Carbohydrate 1 [x] All       []    []    []  []  4 12/15/21 KMS  Patient able to correctly identify carbohydrate foods, use the plate method for portion control, and read labels to determine carb portions.   She will aim for 3 carb choices per meal and 1 carb choice at bedtime.    -Plan a carbohydrate-controlled meal based on individualized meal plan  -Demonstrate CHO counting/portion control  1 []  []      Developing strategies for problem solving to promote health/change behavior. -Identify 7 self-care behaviors; Personal health risk factors; Benefits, challenges & strategies for behavioral change and set an individualized goal selection. 1 [x]  4 12/15/21 KMS  [x]Nutrition:  I will eat consistent carbohydrates at meals. []Monitoring  []Exercise  []Medication  []Other     Identified Barriers to learning/adherence to self management plan:    None  []  other    Instruction Method:  Lecture/Discussion and Handouts    Supporting Education Materials/Equipment Provided: Self-management manual and Nutritional Packet   []Syriac materials       [] services     []Other:      Encounter Type Date Attended Start Time End Time Comments No Show Dates   Assessment          Session 1         Session 2        1:1 DSMES  12/15/21 KMS 0900 1030      In person Follow-up         Gestational Diabetes         Individual MNT        Meter Instrx        Insulin Instrx           Additional Comments: [x] Pt seen individually due to Covid-19 Safety precautions and no group session available. PCP notified via EMR. Patient will be meeting 1:1 with the dietitian on 1/6/22.          Date:   Follow-up goal attainment based on patients initial DSMES goal    Dr Notified by [] EMR []Fax        []Post class Hgb A1C  []Medication compliance   []Plate method/meal plan compliance   []Able to state the number of Carbohydrate servings eaten at B,L,D   []Testing blood glucose as prescribed by PCP   []Exercise Routine   []Other:   []Other:     []Patient lost to follow-up  Dr Carmen Waddell by []EMR []Fax     Personal Support Plan:      [x] Keep all scheduled doctor appointments   [] Make and keep appointments with specialists (foot, eye, dentist) as recommended   [] Consult my pharmacist about all new medications or to ask any medication questions   [] Get tested for sleep apnea   [] Seek help for:   [] Make an appointment with:   [] Attend smoking cessation classes or call 1-800-QUIT-NOW  [] Attend Diabetes Support Group   [] Use diabetes magazines, books, or credible web-sites like the ADA for more information  [x] Increase exercise at home or join an exercise program:   [] Other:

## 2022-02-13 ENCOUNTER — HOSPITAL ENCOUNTER (EMERGENCY)
Age: 67
Discharge: HOME | End: 2022-02-13
Payer: MEDICARE

## 2022-02-13 VITALS
HEART RATE: 89 BPM | TEMPERATURE: 98.3 F | WEIGHT: 224 LBS | OXYGEN SATURATION: 97 % | DIASTOLIC BLOOD PRESSURE: 122 MMHG | BODY MASS INDEX: 40.97 KG/M2 | SYSTOLIC BLOOD PRESSURE: 164 MMHG | RESPIRATION RATE: 17 BRPM

## 2022-02-13 DIAGNOSIS — R59.1 LYMPHADENOPATHY OF HEAD AND NECK: Primary | ICD-10-CM

## 2022-02-13 PROCEDURE — 99283 EMERGENCY DEPT VISIT LOW MDM: CPT

## 2022-02-13 PROCEDURE — 96372 THER/PROPH/DIAG INJ SC/IM: CPT

## 2022-02-13 PROCEDURE — 6370000000 HC RX 637 (ALT 250 FOR IP): Performed by: PHYSICIAN ASSISTANT

## 2022-02-13 PROCEDURE — 6360000002 HC RX W HCPCS: Performed by: PHYSICIAN ASSISTANT

## 2022-02-13 RX ORDER — AMOXICILLIN AND CLAVULANATE POTASSIUM 875; 125 MG/1; MG/1
1 TABLET, FILM COATED ORAL 2 TIMES DAILY
Qty: 20 TABLET | Refills: 0 | Status: SHIPPED | OUTPATIENT
Start: 2022-02-13 | End: 2022-02-23

## 2022-02-13 RX ORDER — PREDNISONE 20 MG/1
40 TABLET ORAL DAILY
Qty: 10 TABLET | Refills: 0 | Status: SHIPPED | OUTPATIENT
Start: 2022-02-13 | End: 2022-02-18

## 2022-02-13 RX ORDER — AMOXICILLIN AND CLAVULANATE POTASSIUM 875; 125 MG/1; MG/1
1 TABLET, FILM COATED ORAL EVERY 12 HOURS SCHEDULED
Status: DISCONTINUED | OUTPATIENT
Start: 2022-02-13 | End: 2022-02-13 | Stop reason: HOSPADM

## 2022-02-13 RX ORDER — KETOROLAC TROMETHAMINE 30 MG/ML
30 INJECTION, SOLUTION INTRAMUSCULAR; INTRAVENOUS ONCE
Status: COMPLETED | OUTPATIENT
Start: 2022-02-13 | End: 2022-02-13

## 2022-02-13 RX ADMIN — AMOXICILLIN AND CLAVULANATE POTASSIUM 1 TABLET: 875; 125 TABLET, FILM COATED ORAL at 10:35

## 2022-02-13 RX ADMIN — KETOROLAC TROMETHAMINE 30 MG: 30 INJECTION, SOLUTION INTRAMUSCULAR at 10:36

## 2022-02-13 ASSESSMENT — PAIN DESCRIPTION - FREQUENCY: FREQUENCY: CONTINUOUS

## 2022-02-13 ASSESSMENT — PAIN DESCRIPTION - PAIN TYPE: TYPE: ACUTE PAIN

## 2022-02-13 ASSESSMENT — PAIN DESCRIPTION - DESCRIPTORS: DESCRIPTORS: ACHING

## 2022-02-13 ASSESSMENT — PAIN DESCRIPTION - LOCATION: LOCATION: TEETH

## 2022-02-13 ASSESSMENT — PAIN SCALES - GENERAL
PAINLEVEL_OUTOF10: 9
PAINLEVEL_OUTOF10: 9

## 2022-02-13 NOTE — ED PROVIDER NOTES
Independent MLP    HPI:  2/13/22, Time: 10:26 AM TIM Green is a 73 y.o. female presenting to the ED for dentalgia and \"lump under chin\", beginning 1 day ago. The complaint has been persistent, mild in severity, and worsened by nothing. Patient reports that she had similar symptoms several months ago. She followed with a dentist and he removed a piece of tooth from her gum, gave her antibiotics, and symptoms completely resolved. Patient states that the swelling and pain to the gingiva of her anterior lower jaw and on her chin started swelling little bit last night worse today prompting ED evaluation. Denies any difficulty breathing or swallowing. She has been afebrile without recent travel or sick contacts. Denies any injury to the area. Patient denies all other symptoms at this time. Review of Systems:   A complete review of systems was performed and pertinent positives and negatives are stated within HPI, all other systems reviewed and are negative.          --------------------------------------------- PAST HISTORY ---------------------------------------------  Past Medical History:  has a past medical history of Anxiety, Bruising tendency (HCC), Chronic back pain, DDD (degenerative disc disease), lumbar, Depression, Headache, Knee injury, Leg pain, Radiculopathy, lumbar region, and Tobacco abuse. Past Surgical History:  has a past surgical history that includes Cholecystectomy; Tonsillectomy (at age 12); and joint replacement (Bilateral). Social History:  reports that she quit smoking about a year ago. Her smoking use included cigarettes. She has a 42.00 pack-year smoking history. She has never used smokeless tobacco. She reports previous alcohol use. She reports that she does not use drugs. Family History: family history includes Asthma in her mother; Cancer in her father; Heart Attack in her maternal grandfather; Kidney Disease in her sister;  No Known Problems in her brother and brother. The patients home medications have been reviewed. Allergies: Patient has no known allergies. -------------------------------------------------- RESULTS -------------------------------------------------  All laboratory and radiology results have been personally reviewed by myself   LABS:  No results found for this visit on 02/13/22. RADIOLOGY:  Interpreted by Radiologist.  No orders to display       ------------------------- NURSING NOTES AND VITALS REVIEWED ---------------------------   The nursing notes within the ED encounter and vital signs as below have been reviewed. BP (!) 164/122   Pulse 89   Temp 98.3 °F (36.8 °C) (Oral)   Resp 17   Wt 224 lb (101.6 kg)   SpO2 97%   BMI 40.97 kg/m²   Oxygen Saturation Interpretation: Normal      ---------------------------------------------------PHYSICAL EXAM--------------------------------------      Constitutional/General: Alert and oriented x3, well appearing, non toxic in NAD  Head: Normocephalic and atraumatic  Eyes: PERRL, EOMI  Mouth: Oropharynx clear, handling secretions, no trismus. Dentition absent to the lower jaw. Uvula midline. No gingival erythema. No dental abscess noted. No drainage. No sublingual swelling. Neck: Supple, full ROM, mild submental lymphadenopathy present. No crepitus. No skin erythema or rash. Pulmonary: Lungs clear to auscultation bilaterally, no wheezes, rales, or rhonchi. Not in respiratory distress  Cardiovascular:  Regular rate and rhythm, no murmurs, gallops, or rubs. 2+ distal pulses  Abdomen: Soft, non tender, non distended,   Extremities: Moves all extremities x 4.  Warm and well perfused  Skin: warm and dry without rash  Neurologic: GCS 15,  Psych: Normal Affect      ------------------------------ ED COURSE/MEDICAL DECISION MAKING----------------------  Medications   amoxicillin-clavulanate (AUGMENTIN) 875-125 MG per tablet 1 tablet (1 tablet Oral Given 2/13/22 1035)   ketorolac (TORADOL) injection 30 mg (30 mg IntraMUSCular Given 2/13/22 1036)         ED COURSE:  ED Course as of 02/13/22 1053   Sun Feb 13, 2022   1047 Patient is a 61-year-old female presenting emergency department with dental pain and lymphadenopathy noted in the submental area. There is no overlying erythema, skin rash, or crepitus noted on physical exam.  No evidence of dental abscess intraorally. There is no significant swelling, sublingual swelling, tongue elevation, or airway compromise on physical exam.  She has had symptoms like this before which resolved with steroids and antibiotics. She does follow very closely with her dentist.  At this time we will plan for outpatient management with antibiotics and steroids. Advised to monitor symptoms very closely. Return precautions discussed. Advised follow-up with dentist in the next 24 to 48 hours and return to the emergency department with any new or worsening symptoms. Patient voiced understanding is agreeable to the above treatment plan. [MS]      ED Course User Index  [MS] Marcie Galan       Medical Decision Making:    See ED course above. Counseling: The emergency provider has spoken with the patient and discussed todays results, in addition to providing specific details for the plan of care and counseling regarding the diagnosis and prognosis. Questions are answered at this time and they are agreeable with the plan.      --------------------------------- IMPRESSION AND DISPOSITION ---------------------------------    IMPRESSION  1. Lymphadenopathy of head and neck        DISPOSITION  Disposition: Discharge to home  Patient condition is stable      NOTE: This report was transcribed using voice recognition software.  Every effort was made to ensure accuracy; however, inadvertent computerized transcription errors may be present        Marcie Galan  02/13/22 1053

## 2022-03-03 NOTE — PROGRESS NOTES
Diabetes Self-Management Education Record    Participant Name: Leland Solorio  Referring Provider: Fidelina Corley PA-C  Assessment/Evaluation Ratings:  1=Needs Instruction   4=Demonstrates Understanding/Competency  2=Needs Review   NC=Not Covered    3=Comprehends Key Points  N/A=Not Applicable  Topics/Learning Objectives Pre-session Assess Date:  Instructor initials/date  12/15/21 KMS Instr. Date    Instructor initials/date  12/15/21 KMS Follow-up Post- session Eval Comments   Diabetes disease process & Treatment process:   -Define type of diabetes in simple terms.  - Describe the ABCs of  diabetes management  -Identify own type of diabetes  -Identify lifestyle changes/treatment options  -other:  2 [x] All     []  []  []  []  []  4 New Type 2 DM   Developing strategies for Healthy coping/psychosocial issues:    -Describe feelings about living with diabetes  -Identify coping strategies and sources of stress  -Identify support needed & support network available  -Complete PAID-5 Diabetes questionnaire 1 [x] All     []  []  []    []  4   12/15/21 KMS  PAID-5 Score: 3     Prevention, detection & treatment of Chronic complications:    -Identify the prevention, detection and treatment for complications including immunizations, preventive eye, foot, dental and renal exams as indicated per the participant's duration of diabetes and health status.  -Define the natural course of diabetes and the relationship of blood glucose levels to long term complications of diabetes.   1 [x] All     []            []  4    Prevention, detection & treatment of acute complications:    -State the causes,signs & symptoms of hyper & hypoglycemia, and prevention & treatment strategies.   -Describe sick day guidelines  DKA /indications for ketone testing &  when to call physician  1 [x] All     []      []    4      -Identify severe weather/situation crisis  & diabetes supplies management  []      Using medications safely:   -State effects of diabetes medicines on blood glucose levels;  -List diabetes medication taken, action & side effects 1 [x] All     []  []  4 12/15/21 KMS  Metformin 500 mg BID    Insulin/Injectables/glucagon  -Name appropriate injection sites; proper storage; supplies needed;     []   N/A    Demonstrate proper technique  []      Monitoring blood glucose, interpreting and using results:   -Identify the purpose of testing   -Identify recommended & personal blood glucose targets & HgbA1C target levels  -State the Importance of logging blood glucose levels for pattern recognition;   -State benefits of reading/using pt generated health data  -Verbalize safe lancet disposal 1 [x] All     []  []    []  []  []  3/4 12/15/21 KMS  Patient has meter at home, but forgot to bring for instruction. States she believes it is a Free[employer]. Instructed patient on a demo meter to give her a general idea of how to use. She states she will bring her glucose meter to the doctor's office for her appointment at 12/16 and the staff will help her there.    -Demonstrate proper testing technique  []      Incorporating physical activity into lifestyle:   -State effect of exercise on blood glucose levels;   -State benefits of regular exercise;   -Define safety considerations/food choices if needed.  -Describe contraindications/maintenance of activity. 1 [x] All     []  []    []  []  4 12/15/21 KMS  Patient is not physically active, but used to exercise at a local gym. Plans to start exercising again. Incorporating nutritional management into lifestyle:   -Describe effect of type, amount & timing of food on blood glucose  -Describe methods for preparing and planning healthy meals  -Correctly read food labels  -Name 3 foods high in Carbohydrate 1 [x] All       []    []    []  []  4 12/15/21 KMS  Patient able to correctly identify carbohydrate foods, use the plate method for portion control, and read labels to determine carb portions.   She will aim for 3 carb choices per meal and 1 carb choice at bedtime.    -Plan a carbohydrate-controlled meal based on individualized meal plan  -Demonstrate CHO counting/portion control  1 []  []      Developing strategies for problem solving to promote health/change behavior. -Identify 7 self-care behaviors; Personal health risk factors; Benefits, challenges & strategies for behavioral change and set an individualized goal selection. 1 [x]  4 12/15/21 KMS  [x]Nutrition:  I will eat consistent carbohydrates at meals. []Monitoring  []Exercise  []Medication  []Other     Identified Barriers to learning/adherence to self management plan:    None  []  other    Instruction Method:  Lecture/Discussion and Handouts    Supporting Education Materials/Equipment Provided: Self-management manual and Nutritional Packet   []H[place]er materials       [] services     []Other:      Encounter Type Date Attended Start Time End Time Comments No Show Dates   Assessment          Session 1         Session 2        1:1 DSMES  12/15/21 KMS 0900 1030      In person Follow-up         Gestational Diabetes         Individual MNT        Meter Instrx        Insulin Instrx           Additional Comments: [x] Pt seen individually due to Covid-19 Safety precautions and no group session available. PCP notified via EMR. Patient will be meeting 1:1 with the dietitian on 1/6/22.          Date:   3/3/22 PC Follow-up goal attainment based on patients initial DSMES goal Stated met   Dr Notified by [x] EMR []Fax        []Post class Hgb A1C  []Medication compliance   [x]Plate method/meal plan compliance   []Able to state the number of Carbohydrate servings eaten at B,L,D   []Testing blood glucose as prescribed by PCP   []Exercise Routine   []Other:   []Other:     []Patient lost to follow-up  Dr Soto Patches by []EMR []Fax     Personal Support Plan:      [x] Keep all scheduled doctor appointments   [] Make and keep appointments with specialists (foot, eye, dentist) as recommended   [] Consult my pharmacist about all new medications or to ask any medication questions   [] Get tested for sleep apnea   [] Seek help for:   [] Make an appointment with:   [] Attend smoking cessation classes or call 1-800-QUIT-NOW  [] Attend Diabetes Support Group   [] Use diabetes magazines, books, or credible web-sites like the ADA for more information  [x] Increase exercise at home or join an exercise program:   [] Other:

## 2022-09-07 ENCOUNTER — TELEPHONE (OUTPATIENT)
Dept: SLEEP CENTER | Age: 67
End: 2022-09-07

## 2022-11-19 ENCOUNTER — HOSPITAL ENCOUNTER (OUTPATIENT)
Dept: SLEEP CENTER | Age: 67
Discharge: HOME | End: 2022-11-19
Payer: MEDICARE

## 2022-11-19 DIAGNOSIS — G47.33 OSA (OBSTRUCTIVE SLEEP APNEA): Primary | ICD-10-CM

## 2022-11-19 PROCEDURE — 95810 POLYSOM 6/> YRS 4/> PARAM: CPT

## 2022-12-08 NOTE — PROGRESS NOTES
[address]    SLEEP STUDY REPORT     PATIENT NAME: Sepideh العلي                        : 1955  MED REC NO:   [id number]                          ACCOUNT NO: [de-identified]                              PROVIDER:     Segundo Bhandari MD     DATE OF STUDY: 2022     FULL NIGHT POLYSOMNOGRAM REPORT     LOCATION:  [address] [place] [address] PROVIDER:  ESTELLE Beverly    AGE: 78 yrs      SEX: Female          HEIGHT: 5 ft 2 in         WEIGHT: 170 lbs          BMI: 31.1 kg/m2    NECK CIRCUMFERENCE: 15 in    Symptoms: Restless legs, difficulty falling asleep, difficulty returning to sleep after waking, waking earlier than desired. [place] Sleepiness Scale was 4 out of 24 (scores above or equal to 10 are suggestive of hypersomnolence). Indication: Suspected obstructive sleep apnea. Medical History: Obesity, depression, hypertension, chronic leg pain, GERD, claustrophobia. Medications: Protonix, lisinopril, Pristiq, clonidine, Seroquel. DESCRIPTION: This full night polysomnogram consisted of EEG, EOG, EMG and 2-lead ECG monitoring. Oronasal airflow (nasal pressure transducer and thermistor), chest and abdominal efforts by respiratory inductance plethysmography or polyvinylidene fluoride (PVDF) sensor, and pulse oximetry were monitored as well. Hypopneas were scored as at least a 30% reduction in amplitude of the semi-quantitative flow signal, associated with a 4% or greater oxygen desaturation. Respiratory effort related arousals (RERAs) were scored as at least a 30% reduction in amplitude of the semi-quantitative flow signal, associated with an EEG microarousal.     FINDINGS:  SLEEP CONTINUITY AND SLEEP ARCHITECTURE:  Lights were turned off at 9:58 PM and lights were turned on at 4:58 AM. Total recording time was 419 minutes and total sleep time was 318 minutes.  The overall sleep efficiency was slightly reduced at 76%. Sleep onset latency was slightly increased at 37 minutes and REM latency was normal at 72 minutes. There were 24 awakenings during this study. The duration of wakefulness after sleep onset was 64 minutes. The amount of N1 sleep was 11% of total sleep time, or 36 minutes. The amount of N2 sleep was 55% of total sleep time, or 174 minutes. The amount of REM sleep was 30% of total sleep time, or 96 minutes. Slow wave sleep was 4% of total sleep time, or 13 minutes. The microarousal index was significantly increased at 31; arousals were mostly related to periodic limb movements, but were also partially caused by respiratory events and were spontaneous in nature. RESPIRATORY MONITORING:  This study documented 0 obstructive apneas, 1 central apneas, 0 mixed apneas, 33 hypopneas, and 20 respiratory effort related arousals (RERAs) during the total recorded sleep time. The overall apnea/hypopnea index (AHI) was 6.4. The overall respiratory disturbance index (RDI includes RERAs) was 10.2. The non-REM RDI was 5.4 and the REM RDI was 21.4. The patient slept supine for 79% of total sleep time, and the supine RDI was 12.4. The patient slept in the right lateral position for the remainder of the night, with a right lateral RDI of 1.8. Of note, all REM sleep was supine, and most scoreable events occurred in supine REM sleep. However, significant flow limitation was observed that did not rise to the level of respiratory events as per CMS scoring criteria. The average oxyhemoglobin saturation was 95% while awake, 93% during non-REM sleep and 93% during REM sleep. The overall 4% oxygen desaturation index during sleep was 7. The lowest oxygen saturation during sleep was 87%, very briefly. Oxygen saturations were less than or equal to 88% for less than 1 minute of the total sleep time. Soft snoring was noted. EEG: No abnormal epileptiform activity was observed.     ECG: The electrocardiogram documented normal sinus rhythm. The average heart rate during sleep was 63 beats per minute. PERIODIC LIMB MOVEMENTS: Frequent periodic limb movements were noted, often associated with arousals. As noted above, many periodic limb movements occurred in the context of flow limited breathing that did not rise to the level of overt respiratory events as per CMS scoring criteria. EMG/VIDEO MONITORING: Loss of REM atonia, bruxism, and parasomnias were not observed. IMPRESSION:   1. This study is consistent with mild obstructive sleep apnea that occurs predominantly in supine and REM sleep. Nonsupine REM sleep was not achieved on this study. The true severity of sleep-disordered breathing was likely underestimated, as significant flow limitation was observed that did not rise to the level of overt respiratory events as per CMS scoring criteria. 2.  A split night study was not performed due to the mild nature of this patient's sleep disordered breathing. 3.  Frequent periodic limb movements were noted, often associated with arousals and in the context of flow limited breathing. RECOMMENDATIONS:    1. Treatment for mild obstructive sleep apnea should be based on patient's symptoms and comorbid conditions. Treatment options include CPAP therapy, oral appliance therapy, ENT evaluation, and/or weight loss efforts. In the context of this patient's significant periodic limb movements, definitive management with CPAP therapy would be reasonable. If the patient is amenable, she would be a good candidate for auto CPAP therapy. This will be discussed with her during her follow-up in the [address] [place] [address]   2. The patient should be strongly counseled against driving while drowsy. 3.  Weight loss efforts should be encouraged, as the severity of obstructive sleep apnea may improve although no guarantee of cure can be made.       Nalini Mahmood MD

## 2022-12-09 ENCOUNTER — TELEPHONE (OUTPATIENT)
Dept: SLEEP CENTER | Age: 67
End: 2022-12-09

## 2022-12-09 NOTE — TELEPHONE ENCOUNTER
Call to pt discussed SS results. Pt wants to talk to a provider before her cpap is ordered.  Appt moved up

## 2023-01-12 ENCOUNTER — OFFICE VISIT (OUTPATIENT)
Dept: SLEEP CENTER | Age: 68
End: 2023-01-12

## 2023-01-12 VITALS
DIASTOLIC BLOOD PRESSURE: 85 MMHG | OXYGEN SATURATION: 95 % | HEART RATE: 70 BPM | HEIGHT: 62 IN | BODY MASS INDEX: 31.67 KG/M2 | WEIGHT: 172.1 LBS | SYSTOLIC BLOOD PRESSURE: 131 MMHG | RESPIRATION RATE: 16 BRPM

## 2023-01-12 DIAGNOSIS — G47.33 OBSTRUCTIVE SLEEP APNEA: Primary | ICD-10-CM

## 2023-01-12 DIAGNOSIS — G47.00 INSOMNIA, UNSPECIFIED TYPE: ICD-10-CM

## 2023-01-12 ASSESSMENT — SLEEP AND FATIGUE QUESTIONNAIRES
HOW LIKELY ARE YOU TO NOD OFF OR FALL ASLEEP WHILE SITTING INACTIVE IN A PUBLIC PLACE: 0
HOW LIKELY ARE YOU TO NOD OFF OR FALL ASLEEP WHILE WATCHING TV: 0
HOW LIKELY ARE YOU TO NOD OFF OR FALL ASLEEP WHILE SITTING AND READING: 0
HOW LIKELY ARE YOU TO NOD OFF OR FALL ASLEEP WHEN YOU ARE A PASSENGER IN A CAR FOR AN HOUR WITHOUT A BREAK: 0
HOW LIKELY ARE YOU TO NOD OFF OR FALL ASLEEP WHILE LYING DOWN TO REST IN THE AFTERNOON WHEN CIRCUMSTANCES PERMIT: 0
HOW LIKELY ARE YOU TO NOD OFF OR FALL ASLEEP WHILE SITTING QUIETLY AFTER LUNCH WITHOUT ALCOHOL: 0
HOW LIKELY ARE YOU TO NOD OFF OR FALL ASLEEP WHILE SITTING AND TALKING TO SOMEONE: 0

## 2023-01-12 NOTE — PATIENT INSTRUCTIONS
Cognitive Behavioral Therapy for Insomnia Resources    Book: Say Thai to Insomnia by Dr. Max Hills ($12 on [address] [facility] [address])

## 2023-01-12 NOTE — PROGRESS NOTES
[facility] Sleep Medicine    Patient Name: Derek Pelaez  Age: 78 y.o.   : 1955    Date of Visit: 23      HPI   Derek Pelaez is a 78 y.o. female with  has a past medical history of Anxiety, Bruising tendency, Chronic back pain, DDD (degenerative disc disease), lumbar (2014), Depression, Headache, Knee injury (right knee ), Leg pain, Radiculopathy, lumbar region (2014), and Tobacco abuse. She presents as a new patient to Sleep Clinic, referred by Saúl Lozano CNP for Sleep Apnea   . Patient presents to establish with sleep medicine, referred by her psychiatric CNP, to discuss sleep study results performed on 2022. This was her first sleep study, it was ordered because she has a difficult time staying asleep, waking up through the night every 10-60 minutes. Most times it is difficult for her to return to sleep as well, she often will get out of bed and smoke a cigarette if she cannot get back to sleep. Her overall sleep quality is poor, per patient, she also has a difficult time getting to sleep taking about an hour on average. She feels her racing mind is part of the issue with her sleep, she worries about her children as well as other stress issues. Notes feeling tired upon wakening the next day. She does not take sleeping aids. Has been taking Seroquel, prescribed by her psychiatric NP, but does not note improvement with her sleep. Patient's average time that she gets into bed is around midnight, and wakes up around 430 for work. She \"feels like \"she sleeps only an hour a night. She denies taking daytime naps because she does not have time to, however feels fatigued during the day/groggy upon wakening. Patient lives and sleeps alone, she is unsure of snoring and witnessed apnea symptoms. She denies waking up with headache or dry mouth and denies difficulty with her memory as well as denies drowsy driving.     Patient mostly sleeps on her side and turns off electronic devices once she gets into bed. As far as caffeine intake she drinks a medium coffee in the morning. She smokes about a half of a pack of cigarettes a day. Patient has lost 50 pounds over the last year. Sleep Study History: 11/19/2022-PSG-weight 170 pounds, sleep efficiency 76%, REM sleep 30%, AHI 6.4, REM RDI 21.4, supine RDI 12.4,significant flow limitation was observed that did not rise to the level of respiratory events as per CMS scoring criteria, also frequent limb movements noted associated with arousals. SPO2 pedro 87%, T<88% was <1% of total sleep time. Bed time:  12 midnight  Wake time:  4:30 am  Sleep Latency (min): About an hour--   Sleep Medications: None  Awakenings: 6-7 times     / Sometimes smokes, tries to stay in bed / Takes time to fall back asleep- 30 min  Estimated Sleep time (hours):  an hour  Daytime Naps: N  Sleep disturbances: None  Sleep Location: Bed  Sleep environment: Sleeps on side    SLEEP HYGIENE     Activities before bed: TV off  Caffeine:  Medium in the am   Coffee    0   Soda    0   Tea  Alcohol: rare  Tobacco: 1/2 pack a day     Sleep ROS:     Snoring: Unsure  Witnessed apneas: Unsure/ doesn't wake up gasping  AM Headache: N  Dry Mouth: N  Daytime Sleepiness: Sometimes, groggy in the am  Difficulty remembering things: N  MVA  or near miss accident due to sleepiness in the past? N  Tonsillectomy? N  Have you lost or gained weight recently? Lost about 50 pounds intentionally over the last year.     PARASOMNIAS/ NARCOLEPSY:  Hypnogogic/Hynopompic Hallucinations: N   Sleep paralysis: N  Cataplexy: N   REM behavior symptoms: N  Sleep Walking: N  Sleep Talking: N  RLS Symptoms: N    Sleep Medicine 1/12/2023   Sitting and reading 0   Watching TV 0   Sitting, inactive in a public place (e.g. a theatre or a meeting) 0   As a passenger in a car for an hour without a break 0   Lying down to rest in the afternoon when circumstances permit 0   Sitting and talking to someone 0   Sitting quietly after a lunch without alcohol 0         PMH:  Past Medical History:   Diagnosis Date    Anxiety     Bruising tendency     Chronic back pain     DDD (degenerative disc disease), lumbar 05/27/2014    Depression     Headache     Knee injury right knee     Leg pain     Radiculopathy, lumbar region 05/09/2014    Tobacco abuse         PSH:  Past Surgical History:   Procedure Laterality Date    CHOLECYSTECTOMY      JOINT REPLACEMENT Bilateral     TONSILLECTOMY  at age 12        Soc Hx:  Social History     Tobacco Use    Smoking status: Every Day     Packs/day: 1.00     Years: 42.00     Pack years: 42.00     Types: Cigarettes    Smokeless tobacco: Never   Vaping Use    Vaping Use: Never used   Substance Use Topics    Alcohol use: Not Currently    Drug use: Never        Fam Hx:  Family History   Problem Relation Age of Onset    Asthma Mother     Cancer Father         mesothelioma    Kidney Disease Sister     No Known Problems Brother     Heart Attack Maternal Grandfather     No Known Problems Brother         Current Outpatient Medications   Medication Sig Dispense Refill    QUEtiapine (SEROQUEL) 200 MG tablet Take 200 mg by mouth daily      pantoprazole (PROTONIX) 40 MG tablet Take 40 mg by mouth daily      desvenlafaxine succinate (PRISTIQ) 50 MG TB24 extended release tablet Take 100 mg by mouth daily. No current facility-administered medications for this visit. Review of Systems  (Sleep ROS above)     Constitutional: no chills, no fever   Eyes: no blurred vision and no eyesight problems. ENT: no hoarseness and no sore throat. Cardiovascular: no chest pain, no lower extremity edema. Respiratory: no cough, no shortness of breath   Gastrointestinal:  no nausea,  no vomiting, no diarrhea. Neurological:  no dizziness,  no headache, no memory changes,  and no tingling.    Endocrine: No changes in appetite, no feelings of weakness    Objective:   Physical Exam  /85 (Site: Left Upper Arm, Position: Sitting, Cuff Size: Large Adult)   Pulse 70   Resp 16   Ht 5' 2\" (1.575 m)   Wt 172 lb 1.6 oz (78.1 kg)   SpO2 95%   BMI 31.48 kg/m²      There were no vitals filed for this visit.    General: No acute distress. BMI of Body mass index is 31.48 kg/m².  HEENT: Normocephalic, atraumatic. No oropharyngeal lesions.    Mallampati class- 2  Tonsils- 1  Neck: Trachea midline  Lungs: Clear to auscultation bilaterally. No wheezes or crackles  Cardiac: Regular rate and rhythm. No murmurs appreciated.  Neurologic: Normal gait. Balance intact.  Psychiatric: Alert and oriented. Appropriate affect.     PERTINENT LAB RESULTS  TSH   Date Value Ref Range Status   06/09/2015 1.730 0.270 - 4.200 uIU/mL Final      No results found for: FERRITIN     Assessment:      Octavia was seen today for sleep apnea.    Diagnoses and all orders for this visit:    Obstructive sleep apnea    Insomnia, unspecified type     Plan:      1. Obstructive Sleep Apnea     -Reviewed sleep study results with patient.  -Mild sleep apnea is typically treated based on symptoms and comorbid conditions.  -Given the frequent periodic limb movements associated with arousals, as well as observed flow limitation, I recommend trialing CPAP and believe this would likely improve her overall sleep quality and decrease nocturnal awakenings.  Patient would like to think about starting PAP therapy before ordering CPAP.  She was advised to call the office if she would like to start CPAP therapy.  -Discussed pathophysiology of CR and its impact on daily well-being, as well as cardiometabolic and neurocognitive health (particularly in moderate-severe cases).  -Also discussed secondary treatment options such as oral appliance made by a dentist, continued weight loss, and positional therapy.  -Aware that weight gain can also contribute to worsening sleep apnea severity.    2. Chronic Sleep Initiation/Maintenance Insomnia     -Patient reports years of  difficulty falling and staying asleep. May be multifactorial: poor sleep hygiene + untreated CR + underlying mental health conditions.  -Recommend treating CR found on recent PSG to help improve sleep patterns.  -Discussed cycle of psychophysiologic insomnia, i.e., poor sleep triggered by precipitating factor leading to suboptimal daytime functioning, causing worry and negative thoughts regarding sleep.  -Introduced Cognitive Behavioral Therapy for Insomnia (gold standard and first line therapy per [facility] for psychophysiologic insomnia) as ideal way to manage insomnia symptoms. Briefly reviewed its core concepts. - Provided resources, including Say Thai to Insomnia by Dr. Eli Randolph. -Offered referral to virtual sleep psychologist to initiate CBT-I, patient declined at this time.  -Discussed sleep hygiene such as avoiding cigarettes within the night and before bed, avoidance of electronics in bed, avoidance of long daytime naps,and  stopping caffeine after lunch. Return as needed if she would like to trial CPAP.     Candice Farias, [facility]  [address] [facility]  P -[phone] option 2  - [phone]

## 2025-01-21 ENCOUNTER — HOSPITAL ENCOUNTER (OUTPATIENT)
Age: 70
Discharge: HOME | End: 2025-01-21
Payer: MEDICARE

## 2025-01-21 LAB
ALBUMIN SERPL-MCNC: 4.2 G/DL (ref 3.5–5.2)
ALP SERPL-CCNC: 94 U/L (ref 35–104)
ALT SERPL-CCNC: 10 U/L (ref 0–32)
ANION GAP SERPL CALCULATED.3IONS-SCNC: 10 MMOL/L (ref 7–16)
AST SERPL-CCNC: 13 U/L (ref 0–31)
BASOPHILS # BLD: 0.08 K/UL (ref 0–0.2)
BASOPHILS NFR BLD: 2 % (ref 0–2)
BILIRUB SERPL-MCNC: 0.3 MG/DL (ref 0–1.2)
BUN SERPL-MCNC: 14 MG/DL (ref 6–23)
CALCIUM SERPL-MCNC: 10.1 MG/DL (ref 8.6–10.2)
CHLORIDE SERPL-SCNC: 100 MMOL/L (ref 98–107)
CO2 SERPL-SCNC: 29 MMOL/L (ref 22–29)
CREAT SERPL-MCNC: 1 MG/DL (ref 0.5–1)
EOSINOPHIL # BLD: 0.09 K/UL (ref 0.05–0.5)
EOSINOPHILS RELATIVE PERCENT: 2 % (ref 0–6)
ERYTHROCYTE [DISTWIDTH] IN BLOOD BY AUTOMATED COUNT: 12.3 % (ref 11.5–15)
GFR, ESTIMATED: 59 ML/MIN/1.73M2
GLUCOSE SERPL-MCNC: 98 MG/DL (ref 74–99)
HCT VFR BLD AUTO: 44.4 % (ref 34–48)
HGB BLD-MCNC: 14.9 G/DL (ref 11.5–15.5)
IMM GRANULOCYTES # BLD AUTO: <0.03 K/UL (ref 0–0.58)
IMM GRANULOCYTES NFR BLD: 0 % (ref 0–5)
LYMPHOCYTES NFR BLD: 1.54 K/UL (ref 1.5–4)
LYMPHOCYTES RELATIVE PERCENT: 29 % (ref 20–42)
MCH RBC QN AUTO: 30.2 PG (ref 26–35)
MCHC RBC AUTO-ENTMCNC: 33.6 G/DL (ref 32–34.5)
MCV RBC AUTO: 90.1 FL (ref 80–99.9)
MONOCYTES NFR BLD: 0.42 K/UL (ref 0.1–0.95)
MONOCYTES NFR BLD: 8 % (ref 2–12)
NEUTROPHILS NFR BLD: 60 % (ref 43–80)
NEUTS SEG NFR BLD: 3.2 K/UL (ref 1.8–7.3)
PLATELET # BLD AUTO: 304 K/UL (ref 130–450)
PMV BLD AUTO: 9.1 FL (ref 7–12)
POTASSIUM SERPL-SCNC: 5.5 MMOL/L (ref 3.5–5)
PROT SERPL-MCNC: 7.4 G/DL (ref 6.4–8.3)
RBC # BLD AUTO: 4.93 M/UL (ref 3.5–5.5)
SODIUM SERPL-SCNC: 139 MMOL/L (ref 132–146)
WBC OTHER # BLD: 5.4 K/UL (ref 4.5–11.5)

## 2025-01-21 PROCEDURE — 80053 COMPREHEN METABOLIC PANEL: CPT

## 2025-01-21 PROCEDURE — 85025 COMPLETE CBC W/AUTO DIFF WBC: CPT

## 2025-01-21 PROCEDURE — 36415 COLL VENOUS BLD VENIPUNCTURE: CPT

## 2025-08-11 ENCOUNTER — HOSPITAL ENCOUNTER (EMERGENCY)
Age: 70
Discharge: HOME | End: 2025-08-11
Payer: COMMERCIAL

## 2025-08-11 ENCOUNTER — APPOINTMENT (OUTPATIENT)
Dept: CT IMAGING | Age: 70
End: 2025-08-11
Payer: COMMERCIAL

## 2025-08-11 VITALS
TEMPERATURE: 97.3 F | WEIGHT: 161 LBS | HEART RATE: 56 BPM | SYSTOLIC BLOOD PRESSURE: 137 MMHG | OXYGEN SATURATION: 100 % | DIASTOLIC BLOOD PRESSURE: 90 MMHG | BODY MASS INDEX: 29.45 KG/M2 | RESPIRATION RATE: 19 BRPM

## 2025-08-11 DIAGNOSIS — S09.90XA CLOSED HEAD INJURY, INITIAL ENCOUNTER: ICD-10-CM
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
## 2025-08-11 DIAGNOSIS — S16.1XXA STRAIN OF NECK MUSCLE, INITIAL ENCOUNTER: Primary | ICD-10-CM

## 2025-08-11 PROCEDURE — 72125 CT NECK SPINE W/O DYE: CPT

## 2025-08-11 PROCEDURE — 99284 EMERGENCY DEPT VISIT MOD MDM: CPT

## 2025-08-11 PROCEDURE — 70450 CT HEAD/BRAIN W/O DYE: CPT

## 2025-08-11 ASSESSMENT — PAIN SCALES - GENERAL: PAINLEVEL_OUTOF10: 8

## 2025-08-11 ASSESSMENT — PAIN - FUNCTIONAL ASSESSMENT: PAIN_FUNCTIONAL_ASSESSMENT: 0-10

## 2025-08-11 ASSESSMENT — PAIN DESCRIPTION - PAIN TYPE: TYPE: ACUTE PAIN

## 2025-08-11 ASSESSMENT — PAIN DESCRIPTION - DIRECTION: RADIATING_TOWARDS: DENIES

## 2025-08-11 ASSESSMENT — PAIN DESCRIPTION - ORIENTATION: ORIENTATION: POSTERIOR

## 2025-08-11 ASSESSMENT — PAIN DESCRIPTION - DESCRIPTORS: DESCRIPTORS: SORE;STABBING

## 2025-08-11 ASSESSMENT — PAIN DESCRIPTION - LOCATION: LOCATION: NECK;BACK

## 2025-08-11 ASSESSMENT — LIFESTYLE VARIABLES: HOW MANY STANDARD DRINKS CONTAINING ALCOHOL DO YOU HAVE ON A TYPICAL DAY: PATIENT DOES NOT DRINK

## 2025-08-11 ASSESSMENT — PAIN DESCRIPTION - FREQUENCY: FREQUENCY: CONTINUOUS
